# Patient Record
Sex: MALE | Race: WHITE | NOT HISPANIC OR LATINO | Employment: FULL TIME | URBAN - NONMETROPOLITAN AREA
[De-identification: names, ages, dates, MRNs, and addresses within clinical notes are randomized per-mention and may not be internally consistent; named-entity substitution may affect disease eponyms.]

---

## 2018-01-22 ENCOUNTER — OFFICE VISIT CONVERTED (OUTPATIENT)
Dept: FAMILY MEDICINE CLINIC | Age: 23
End: 2018-01-22
Attending: FAMILY MEDICINE

## 2019-03-13 ENCOUNTER — OFFICE VISIT CONVERTED (OUTPATIENT)
Dept: FAMILY MEDICINE CLINIC | Age: 24
End: 2019-03-13
Attending: FAMILY MEDICINE

## 2019-03-21 ENCOUNTER — HOSPITAL ENCOUNTER (OUTPATIENT)
Dept: OTHER | Facility: HOSPITAL | Age: 24
Discharge: HOME | End: 2019-03-21
Attending: FAMILY MEDICINE

## 2019-03-21 LAB
ALBUMIN SERPL-MCNC: 4.5 G/DL (ref 3.5–5)
ALBUMIN/GLOB SERPL: 1.4 {RATIO} (ref 1.4–2.6)
ALP SERPL-CCNC: 122 U/L (ref 53–128)
ALT SERPL-CCNC: 17 U/L (ref 10–40)
ANION GAP SERPL CALC-SCNC: 16 MMOL/L (ref 8–19)
AST SERPL-CCNC: 17 U/L (ref 15–50)
BASOPHILS # BLD MANUAL: 0.01 10*3/UL (ref 0–0.2)
BASOPHILS NFR BLD MANUAL: 0.2 % (ref 0–3)
BILIRUB SERPL-MCNC: 0.52 MG/DL (ref 0.2–1.3)
BUN SERPL-MCNC: 13 MG/DL (ref 5–25)
BUN/CREAT SERPL: 17 {RATIO} (ref 6–20)
CALCIUM SERPL-MCNC: 9.4 MG/DL (ref 8.7–10.4)
CHLORIDE SERPL-SCNC: 102 MMOL/L (ref 99–111)
CHOLEST SERPL-MCNC: 162 MG/DL (ref 107–200)
CHOLEST/HDLC SERPL: 4 {RATIO} (ref 3–6)
CONV CO2: 25 MMOL/L (ref 22–32)
CONV TOTAL PROTEIN: 7.8 G/DL (ref 6.3–8.2)
CREAT UR-MCNC: 0.75 MG/DL (ref 0.7–1.2)
DEPRECATED RDW RBC AUTO: 38.4 FL
EOSINOPHIL # BLD MANUAL: 0.04 10*3/UL (ref 0–0.7)
EOSINOPHIL NFR BLD MANUAL: 0.8 % (ref 0–7)
ERYTHROCYTE [DISTWIDTH] IN BLOOD BY AUTOMATED COUNT: 12 % (ref 11.5–14.5)
GFR SERPLBLD BASED ON 1.73 SQ M-ARVRAT: >60 ML/MIN/{1.73_M2}
GLOBULIN UR ELPH-MCNC: 3.3 G/DL (ref 2–3.5)
GLUCOSE SERPL-MCNC: 91 MG/DL (ref 70–99)
GRANS (ABSOLUTE): 2.96 10*3/UL (ref 2–8)
GRANS: 57.9 % (ref 30–85)
HBA1C MFR BLD: 15.3 G/DL (ref 14–18)
HCT VFR BLD AUTO: 44.5 % (ref 42–52)
HDLC SERPL-MCNC: 41 MG/DL (ref 40–60)
IMM GRANULOCYTES # BLD: 0.01 10*3/UL (ref 0–0.54)
IMM GRANULOCYTES NFR BLD: 0.2 % (ref 0–0.43)
LDLC SERPL CALC-MCNC: 105 MG/DL (ref 70–100)
LYMPHOCYTES # BLD MANUAL: 1.81 10*3/UL (ref 1–5)
LYMPHOCYTES NFR BLD MANUAL: 5.5 % (ref 3–10)
MCH RBC QN AUTO: 29.7 PG (ref 27–31)
MCHC RBC AUTO-ENTMCNC: 34.4 G/DL (ref 33–37)
MCV RBC AUTO: 86.2 FL (ref 80–96)
MONOCYTES # BLD AUTO: 0.28 10*3/UL (ref 0.2–1.2)
OSMOLALITY SERPL CALC.SUM OF ELEC: 288 MOSM/KG (ref 273–304)
PLATELET # BLD AUTO: 339 10*3/UL (ref 130–400)
PMV BLD AUTO: 9.2 FL (ref 7.4–10.4)
POTASSIUM SERPL-SCNC: 4 MMOL/L (ref 3.5–5.3)
RBC # BLD AUTO: 5.16 10*6/UL (ref 4.7–6.1)
SODIUM SERPL-SCNC: 139 MMOL/L (ref 135–147)
TRIGL SERPL-MCNC: 82 MG/DL (ref 40–150)
TSH SERPL-ACNC: 1.21 M[IU]/L (ref 0.27–4.2)
VARIANT LYMPHS NFR BLD MANUAL: 35.4 % (ref 20–45)
VLDLC SERPL-MCNC: 16 MG/DL (ref 5–37)
WBC # BLD AUTO: 5.11 10*3/UL (ref 4.8–10.8)

## 2020-04-24 ENCOUNTER — OFFICE VISIT CONVERTED (OUTPATIENT)
Dept: FAMILY MEDICINE CLINIC | Age: 25
End: 2020-04-24
Attending: FAMILY MEDICINE

## 2020-05-07 ENCOUNTER — OFFICE VISIT CONVERTED (OUTPATIENT)
Dept: FAMILY MEDICINE CLINIC | Age: 25
End: 2020-05-07
Attending: FAMILY MEDICINE

## 2020-05-19 ENCOUNTER — HOSPITAL ENCOUNTER (OUTPATIENT)
Dept: OTHER | Facility: HOSPITAL | Age: 25
Discharge: HOME | End: 2020-05-19
Attending: FAMILY MEDICINE

## 2020-05-20 LAB
ALBUMIN SERPL-MCNC: 4.6 G/DL (ref 3.5–5)
ALBUMIN/GLOB SERPL: 1.5 {RATIO} (ref 1.4–2.6)
ALP SERPL-CCNC: 123 U/L (ref 53–128)
ALT SERPL-CCNC: 19 U/L (ref 10–40)
ANION GAP SERPL CALC-SCNC: 17 MMOL/L (ref 8–19)
AST SERPL-CCNC: 18 U/L (ref 15–50)
BASOPHILS # BLD MANUAL: 0.02 10*3/UL (ref 0–0.2)
BASOPHILS NFR BLD MANUAL: 0.2 % (ref 0–3)
BILIRUB SERPL-MCNC: 0.57 MG/DL (ref 0.2–1.3)
BUN SERPL-MCNC: 12 MG/DL (ref 5–25)
BUN/CREAT SERPL: 12 {RATIO} (ref 6–20)
CALCIUM SERPL-MCNC: 9.8 MG/DL (ref 8.7–10.4)
CHLORIDE SERPL-SCNC: 101 MMOL/L (ref 99–111)
CHOLEST SERPL-MCNC: 173 MG/DL (ref 107–200)
CHOLEST/HDLC SERPL: 4.3 {RATIO} (ref 3–6)
CONV CO2: 25 MMOL/L (ref 22–32)
CONV TOTAL PROTEIN: 7.6 G/DL (ref 6.3–8.2)
CREAT UR-MCNC: 0.98 MG/DL (ref 0.7–1.2)
DEPRECATED RDW RBC AUTO: 38.9 FL
EOSINOPHIL # BLD MANUAL: 0.03 10*3/UL (ref 0–0.7)
EOSINOPHIL NFR BLD MANUAL: 0.4 % (ref 0–7)
ERYTHROCYTE [DISTWIDTH] IN BLOOD BY AUTOMATED COUNT: 12.3 % (ref 11.5–14.5)
GFR SERPLBLD BASED ON 1.73 SQ M-ARVRAT: >60 ML/MIN/{1.73_M2}
GLOBULIN UR ELPH-MCNC: 3 G/DL (ref 2–3.5)
GLUCOSE SERPL-MCNC: 93 MG/DL (ref 70–99)
GRANS (ABSOLUTE): 6.03 10*3/UL (ref 2–8)
GRANS: 74.5 % (ref 30–85)
HBA1C MFR BLD: 15.5 G/DL (ref 14–18)
HCT VFR BLD AUTO: 46.1 % (ref 42–52)
HDLC SERPL-MCNC: 40 MG/DL (ref 40–60)
IMM GRANULOCYTES # BLD: 0.03 10*3/UL (ref 0–0.54)
IMM GRANULOCYTES NFR BLD: 0.4 % (ref 0–0.43)
LDLC SERPL CALC-MCNC: 115 MG/DL (ref 70–100)
LYMPHOCYTES # BLD MANUAL: 1.55 10*3/UL (ref 1–5)
LYMPHOCYTES NFR BLD MANUAL: 5.4 % (ref 3–10)
MCH RBC QN AUTO: 28.9 PG (ref 27–31)
MCHC RBC AUTO-ENTMCNC: 33.6 G/DL (ref 33–37)
MCV RBC AUTO: 86 FL (ref 80–96)
MONOCYTES # BLD AUTO: 0.44 10*3/UL (ref 0.2–1.2)
OSMOLALITY SERPL CALC.SUM OF ELEC: 287 MOSM/KG (ref 273–304)
PLATELET # BLD AUTO: 332 10*3/UL (ref 130–400)
PMV BLD AUTO: 8.6 FL (ref 7.4–10.4)
POTASSIUM SERPL-SCNC: 4.1 MMOL/L (ref 3.5–5.3)
RBC # BLD AUTO: 5.36 10*6/UL (ref 4.7–6.1)
SODIUM SERPL-SCNC: 139 MMOL/L (ref 135–147)
TRIGL SERPL-MCNC: 89 MG/DL (ref 40–150)
TSH SERPL-ACNC: 1.04 M[IU]/L (ref 0.27–4.2)
VARIANT LYMPHS NFR BLD MANUAL: 19.1 % (ref 20–45)
VLDLC SERPL-MCNC: 18 MG/DL (ref 5–37)
WBC # BLD AUTO: 8.1 10*3/UL (ref 4.8–10.8)

## 2020-05-27 ENCOUNTER — OFFICE VISIT CONVERTED (OUTPATIENT)
Dept: FAMILY MEDICINE CLINIC | Age: 25
End: 2020-05-27
Attending: FAMILY MEDICINE

## 2020-06-02 ENCOUNTER — OFFICE VISIT CONVERTED (OUTPATIENT)
Dept: FAMILY MEDICINE CLINIC | Age: 25
End: 2020-06-02
Attending: FAMILY MEDICINE

## 2020-06-16 ENCOUNTER — HOSPITAL ENCOUNTER (OUTPATIENT)
Dept: PHYSICAL THERAPY | Facility: CLINIC | Age: 25
Setting detail: RECURRING SERIES
Discharge: HOME | End: 2020-07-27
Attending: FAMILY MEDICINE

## 2020-07-15 ENCOUNTER — OFFICE VISIT CONVERTED (OUTPATIENT)
Dept: OTOLARYNGOLOGY | Facility: CLINIC | Age: 25
End: 2020-07-15
Attending: OTOLARYNGOLOGY

## 2020-10-21 ENCOUNTER — OFFICE VISIT CONVERTED (OUTPATIENT)
Dept: FAMILY MEDICINE CLINIC | Age: 25
End: 2020-10-21
Attending: FAMILY MEDICINE

## 2021-05-10 NOTE — H&P
History and Physical      Patient Name: David Aaron   Patient ID: [id number]   Sex: Male   YOB: 1995    Primary Care Provider: Carlos Silva MD   Referring Provider: Carlos Silva MD    Visit Date: July 15, 2020    Provider: Yung Arcos MD   Location: [facility]   Location Address: [address] [place], [place] [address]an [facility]  [address] [place] [address]  [id number]   Location Phone: [phone]          Chief Complaint     1.  Shortness of breath    2.  Allergic rhinitis       History Of Present Illness  David Aaron is a 25 year old male who presents to the office today as a consult from Carlos Silva MD.      He presents the clinic today for evaluation of issues with allergic rhinitis and shortness of breath.  He notes that he has had significant issues with nasal congestion, seems to be worse during allergy season.  He is currently not doing any nasal saline irrigations.  He does have fluticasone, but does not use this regularly.  He was seeing an allergist, and manometry indicated a flat inspiratory loop, possibly suggestive of vocal fold dysfunction.  He notes that he has never had breathy or hoarse voice or loss of voice.  He denies any stridor during the spells.  He was sent here for direct visualization of the vocal folds due to these results.       Past Medical History  Allergic rhinitis; Anxiety         Past Surgical History  Appendectomy         Medication List  epinephrine 0.3 mg/0.3 mL injection auto-injector; fluticasone propionate 50 mcg/actuation nasal spray,suspension; hydroxyzine HCl 25 mg oral tablet; hyoscyamine sulfate 0.375 mg oral tablet extended release 12 hr; Zyrtec 10 mg oral tablet         Allergy List  NO KNOWN DRUG ALLERGIES         Family Medical History  Family history of heart disease; Family history of diabetes mellitus         Social History  Tobacco (Never)         Review of Systems  · Constitutional  o Denies  o : fever, night sweats, weight  "loss  · Eyes  o Denies  o : discharge from eye, impaired vision  · HENT  o Admits  o : *See HPI  · Cardiovascular  o Denies  o : chest pain, irregular heart beats  · Respiratory  o Denies  o : shortness of breath, wheezing, coughing up blood  · Gastrointestinal  o Admits  o : reflux  o Denies  o : heartburn, vomiting blood  · Genitourinary  o Denies  o : frequency of urine  · Integument  o Denies  o : rash, skin dryness  · Neurologic  o Denies  o : seizures, loss of balance, loss of consciousness, dizziness  · Endocrine  o Denies  o : cold intolerance, heat intolerance  · Heme-Lymph  o Denies  o : easy bleeding, anemia      Vitals  Date Time BP Position Site L\R Cuff Size HR RR TEMP (F) WT  HT  BMI kg/m2 BSA m2 O2 Sat HC       07/15/2020 11:41 AM       16 96.7 231lbs 16oz 5'  10\" 33.29 2.28           Physical Examination  · Constitutional  o Appearance  o : well developed, well-nourished, alert and in no acute distress, voice clear and strong  · Head and Face  o Head  o :   § Inspection  § : no deformities or lesions  o Face  o :   § Inspection  § : No facial lesions; House-Brackmann I/VI bilaterally  § Palpation  § : No TMJ crepitus nor  muscle tenderness bilaterally  · Eyes  o Vision  o :   § Visual Fields  § : Extraocular movements are intact. No spontaneous or gaze-induced nystagmus.  o Conjunctivae  o : clear  o Sclerae  o : clear  o Pupils and Irises  o : pupils equal, round, and reactive to light.   · Ears, Nose, Mouth and Throat  o Ears  o :   § External Ears  § : appearance within normal limits, no lesions present  § Otoscopic Examination  § : tympanic membrane appearance within normal limits bilaterally without perforations, well-aerated middle ears  § Hearing  § : intact to conversational voice both ears  o Nose  o :   § External Nose  § : appearance normal  § Intranasal Exam  § : mucosa within normal limits, vestibules normal, no intranasal lesions present, septum midline, sinuses non tender to " percussion  o Oral Cavity  o :   § Oral Mucosa  § : oral mucosa normal without pallor or cyanosis  § Lips  § : lip appearance normal  § Teeth  § : normal dentition for age  § Gums  § : gums pink, non-swollen, no bleeding present  § Tongue  § : tongue appearance normal; normal mobility  § Palate  § : hard palate normal, soft palate appearance normal with symmetric mobility  o Throat  o :   § Oropharynx  § : no inflammation or lesions present, tonsils within normal limits  § Hypopharynx  § : appearance within normal limits, superior epiglottis within normal limits  § Larynx  § : appearance within normal limits, vocal cords within normal limits, no lesions present  · Neck  o Inspection/Palpation  o : normal appearance, no masses or tenderness, trachea midline; thyroid size normal, nontender, no nodules or masses present on palpation  · Respiratory  o Respiratory Effort  o : breathing unlabored  o Inspection of Chest  o : normal appearance, no retractions  · Cardiovascular  o Heart  o : regular rate and rhythm  · Lymphatic  o Neck  o : no lymphadenopathy present  o Supraclavicular Nodes  o : no lymphadenopathy present  o Preauricular Nodes  o : no lymphadenopathy present  · Skin and Subcutaneous Tissue  o General Inspection  o : Regarding face and neck - there are no rashes present, no lesions present, and no areas of discoloration  · Neurologic  o Cranial Nerves  o : cranial nerves II-XII are grossly intact bilaterally  o Gait and Station  o : normal gait, able to stand without diffculty  · Psychiatric  o Judgement and Insight  o : judgment and insight intact  o Mood and Affect  o : mood normal, affect appropriate          Assessment  · Allergic rhinitis     477.9/J30.9  · Shortness of breath     786.05/R06.02    Problems Reconciled  Plan  · Medications  o Medications have been Reconciled  o Transition of Care or Provider Policy  · Instructions  o He presents the clinic today for evaluation of issues with allergic  rhinitis and shortness of breath. He notes that he has had significant issues with nasal congestion, seems to be worse during allergy season. He is currently not doing any nasal saline irrigations. He does have fluticasone, but does not use this regularly. He was seeing an allergist, and manometry indicated a flat inspiratory loop, possibly suggestive of vocal fold dysfunction. He notes that he has never had breathy or hoarse voice or loss of voice. He denies any stridor during the spells. He was sent here for direct visualization of the vocal folds due to these results. On examination, his voice was normal and strong. I informed him about a nasolaryngoscopy, but he wanted to hold off for now as the history does not really fit with paradoxical vocal fold movement. I will have him use nasal saline irrigations and fluticasone on a regular basis, and he will contact me should there be any further issues so that we can plan on the nasolaryngoscopy.  o Electronically Identified Patient Education Materials Provided Electronically            Electronically Signed by: Yung Arcos MD -Author on August 28, 2020 04:47:20 PM

## 2021-05-15 VITALS — WEIGHT: 232 LBS | TEMPERATURE: 96.7 F | BODY MASS INDEX: 33.21 KG/M2 | HEIGHT: 70 IN | RESPIRATION RATE: 16 BRPM

## 2021-05-18 NOTE — PROGRESS NOTES
David Aaron. 1995     Office/Outpatient Visit    Visit Date: Wed, Mar 13, 2019 10:45 am    Provider: Jan Cheema MD (Assistant: Rubi Barclay MA)    Location: [facility]        Electronically signed by Jan Cheema MD on  03/17/2019 05:49:51 PM                             SUBJECTIVE:        CC:     Mr. Aaron is a 23 year old White male.  Patient complains of rash and is seeking cream reccomendations.          HPI:     Rash in groin for the last 3 months. Similar to rash from a year ago. He's taken a bunch of different OTC creams, lotrimin, lamisil, vasoline. Some using twice a day. Cream last time worked well but he ran out of it. Did have athletes foot but not any more.     Slightly elevated blood pressure today and at previous visit. HR also on higher end fo normal.     ROS:     CONSTITUTIONAL:  Negative for chills and fever.      CARDIOVASCULAR:  Negative for chest pain, palpitations, tachycardia, orthopnea, and edema.      RESPIRATORY:  Negative for recent cough and dyspnea.      GASTROINTESTINAL:  Negative for abdominal pain, nausea and vomiting.      INTEGUMENTARY:  Positive for rash.      PSYCHIATRIC:  Negative for anxiety, depression, and sleep disturbances.          PMH/FMH/SH:     Last Reviewed on 3/13/2019 10:59 AM by Jan Cheema    Past Medical History:     UNREMARKABLE         Surgical History:     NONE         Family History:         Positive for Type 2 Diabetes ( pat. GM ).          Social History:         Household:  Lives with his parents and sibling(s) ( 1 brother ).      No exposure to tobacco smoke.          Tobacco/Alcohol/Supplements:     Last Reviewed on 3/13/2019 10:59 AM by Jan Cheema    Tobacco: He has never smoked.          Substance Abuse History:     Last Reviewed on 3/13/2019 10:59 AM by Jan Cheema        Mental Health History:     Last Reviewed on 3/13/2019 10:59 AM by Jan Cheema        Communicable Diseases (eg STDs):     Last  Reviewed on 3/13/2019 10:59 AM by Jan Cheema            Current Problems:     Last Reviewed on 3/13/2019 10:59 AM by Jan Cheema    IBS, diarrhea prominent type     Allergies     Tinea corporis         Immunizations:     None        Allergies:     Last Reviewed on 3/13/2019 10:59 AM by Jan Cheema      No Known Drug Allergies.         Current Medications:     Last Reviewed on 3/13/2019 10:59 AM by Jan Cheema    allergy shots weekly     EpiPen Auto-Injector 2-Siddharth 0.3mg Solution For Injection use as directed  PRN     Fluticasone Propionate 50mcg/1actuation Nasal Spray 1 spray each nostil daily     Hyoscyamine Sulfate 0.375mg Tablets, Extended Release Take 1 tablet(s) by mouth bid     Zyrtec 10mg Tablet 1 tab daily         OBJECTIVE:        Vitals:         Current: 3/13/2019 10:49:35 AM    Ht:  5 ft, 8.5 in;  Wt: 240.4 lbs;  BMI: 36.0    T: 98.5 F (oral);  BP: 139/72 mm Hg (left arm, sitting);  P: 90 bpm (left arm (BP Cuff), sitting);  sCr: 0.89 mg/dL;  GFR: 140.27        Exams:     PHYSICAL EXAM:     GENERAL: Vitals recorded well developed, well nourished;  well groomed;  no apparent distress;     EYES: extraocular movements intact; conjunctiva and cornea are normal; PERRLA;     E/N/T: OROPHARYNX:  normal mucosa, dentition, gingiva, and posterior pharynx;     RESPIRATORY: normal respiratory rate and pattern with no distress; normal breath sounds with no rales, rhonchi, wheezes or rubs;     CARDIOVASCULAR: normal rate; rhythm is regular;  no systolic murmur; no edema;     GASTROINTESTINAL: nontender; normal bowel sounds;     SKIN: tinea cruris;     MUSCULOSKELETAL: normal gait; normal overall tone     NEUROLOGIC: mental status: alert and oriented x 3;     PSYCHIATRIC:  appropriate affect and demeanor; normal speech pattern; grossly normal memory;         ASSESSMENT           110.5   B35.4  Tinea corporis              DDx:     796.2   R03.0  Elevated blood pressure without a diagnosis of  hypertension              DDx:         ORDERS:         Meds Prescribed:       Terbinafine HCl 250mg Tablet 1 tab po daily x 12 wks  #84 ([facility]) tablet(s) Refills: 0         Lab Orders:       [id number]  [facility] PHYSICAL: CMP, CBC, TSH, LIPID: [id number], [id number], [id number], [id number]  (Send-Out)         APPTO  Appointment need  (In-House)                   PLAN:          Tinea corporis DDx jock itch vs yeast. terbanifine prescribed and lotrisone cream as well. If no improvement with these medicines. Pt will call back in 1 week and I'll prescribe nystatin powder and diflucan.           Prescriptions:       Terbinafine HCl 250mg Tablet 1 tab po daily x 12 wks  #84 ([facility]) tablet(s) Refills: 0          Elevated blood pressure without a diagnosis of hypertension Elevated blood pressure today, will get basic labs and if glucose is elevated may add on A1c.     LABORATORY:  Labs ordered to be performed today include PHYSICAL PANEL; CMP, CBC, TSH, LIPID.      FOLLOW-UP: Schedule a follow-up visit in 2 months..            Orders:       [id number]  [facility] PHYSICAL: CMP, CBC, TSH, LIPID: [id number], [id number], [id number], [id number]  (Send-Out)         APPTO  Appointment need  (In-House)               Patient Recommendations:        For  Elevated blood pressure without a diagnosis of hypertension:     Schedule a follow-up visit in 2 months.                APPOINTMENT INFORMATION:        Monday Tuesday Wednesday Thursday Friday Saturday Sunday            Time:___________________AM  PM   Date:_____________________             CHARGE CAPTURE           **Please note: ICD descriptions below are intended for billing purposes only and may not represent clinical diagnoses**        Primary Diagnosis:         110.5 Tinea corporis            B35.4    Tinea corporis              Orders:          [id number]   Office/outpatient visit; established patient, level 4  (In-House)           796.2 Elevated blood pressure without a diagnosis of hypertension             R03.0    Elevated blood-pressure reading, without diagnosis of hypertension              Orders:          APPTO   Appointment need  (In-House)

## 2021-05-18 NOTE — PROGRESS NOTES
"David Aaron  1995     Office/Outpatient Visit    Visit Date: Thu, May 7, 2020 12:50 pm    Provider: Carlos Silva MD (Assistant: Agnieszka Brown MA)    Location: [facility]        Electronically signed by Carlos Silva MD on  05/28/2020 08:56:03 AM                             Subjective:        CC: Mr. Aaron is a 24 year old White male.  establishment with pcp.  Today's encounter is being done with a telehealth visit. He has consented verbally with two witnesses for todays treatment. Todays visit is being conducted by audio only. Individuals present during the telemedicine consultation include Patient and Dr. Silva         HPI: David is being evaluated today for intermittent shortness of breath.  He says he feels as though he \"Cannot get a full breath and.\"  This happens randomly throughout the day and resolve spontaneously.  Of note, he has a history of seasonal/environmental allergies for which he previously saw an allergist but has not been to their office \"in a couple years.\"  He has scheduled an appointment to reestablish with him next week.  He denies fever, chills, cough, chest pain, edema or palpitations.  He has no prior history of lung or heart disease.  Family history is positive for both CAD and COPD.  He is a non-smoker. His current allergy regimen includes Zyrtec 10 mg daily and Flonase daily.  He previously been on allergy shots and is hoping that these will be restarted. He recently went to the emergency department for his complaints of shortness of breath and had an extensive work-up completed including CT PE, chest x-ray, CBC, CMP, troponin, EKG and pro calcitonin.  This work-up was unremarkable except for a mildly elevated white count of 12.5.    ROS:     CONSTITUTIONAL:  Negative for chills, fatigue and fever.      EYES:  Negative for blurred vision.      E/N/T:  Positive for nasal congestion and frequent rhinorrhea.   Negative for ear pain, tinnitus, hoarseness, " sinus pressure or sore throat.      CARDIOVASCULAR:  Negative for chest pain, dizziness, palpitations and edema.      RESPIRATORY:  Positive for dyspnea ( intermittent ).   Negative for cough.      GASTROINTESTINAL:  Negative for abdominal pain, constipation, diarrhea, heartburn, nausea and vomiting.      GENITOURINARY:  Negative for dysuria, hematuria and polyuria.      MUSCULOSKELETAL:  Negative for arthralgias and myalgias.      INTEGUMENTARY:  Negative for rash.      NEUROLOGICAL:  Negative for headaches, paresthesias and weakness.      HEMATOLOGIC/LYMPHATIC:  Negative for easy bruising and excessive bleeding.      ENDOCRINE:  Negative for hair loss, heat/cold intolerance, polydipsia, and polyphagia.      ALLERGIC/IMMUNOLOGIC:  Positive for seasonal allergies.      PSYCHIATRIC:  Negative for anxiety, depression, and sleep disturbances.          Past Medical History / Family History / Social History:         Last Reviewed on 5/28/2020 08:55 AM by Carlos Silva    Past Medical History:     UNREMARKABLE         Surgical History:     NONE         Family History:         Positive for Type 2 Diabetes ( pat. GM ).      Positive for Coronary Artery Disease, Hypertension and Myocardial Infarction;     Positive for COPD;     Positive for Cerebrovascular Accident;         Social History:         Household:  Lives with his parents and sibling(s) ( 1 brother ).      No exposure to tobacco smoke.          Tobacco/Alcohol/Supplements:     Last Reviewed on 5/28/2020 08:55 AM by Carlos Silva    Tobacco: He has never smoked.          Substance Abuse History:     Last Reviewed on 5/28/2020 08:55 AM by Carlos Silva        Mental Health History:     Last Reviewed on 5/28/2020 08:55 AM by Carlos Silva    NEGATIVE         Communicable Diseases (eg STDs):     Last Reviewed on 5/28/2020 08:55 AM by Carlos Silva        Current Problems:     Last Reviewed on 5/28/2020 08:55 AM by Carlos Silva    Shortness of breath    Encounter for  general adult medical examination without abnormal findings    Allergic rhinitis, unspecified    Anxiety disorder, unspecified    Encounter for screening for depression        Immunizations:     None        Allergies:     Last Reviewed on 5/28/2020 08:55 AM by Carlos Silva    No Known Allergies.        Current Medications:     Last Reviewed on 5/28/2020 08:55 AM by Carlos Silva    Zyrtec 10 mg oral tablet [1 tab daily]    Fluticasone Propionate 50 mcg/actuation Intranasal Spray, Suspension [1 spray each nostil daily]    Hyoscyamine Sulfate 0.375 mg oral Tablet, Extended Release 12 hr [Take 1 tablet(s) by mouth bid]    EpiPen Auto-Injector 2-Siddharth 0.3mg 1:1,000 Solution For Injection [use as directed  PRN ]    allergy shots weekly         Assessment:         J30.9   Allergic rhinitis, unspecified       R06.02   Shortness of breath           ORDERS:         Meds Prescribed:       [Refilled] EPINEPHrine 0.3 mg/0.3 mL injection Auto-Injector [inject 0.3 milliliter (0.3 mg) by intramuscular route once], #2 (two) each, Refills: 3 (three)                 Plan:         Allergic rhinitis, unspecified- Not currently controlled.  Patient plans to reestablish with allergist within the next week.  Continue Zyrtec 10 mg daily and Flonase daily.    Telehealth: Verbal consent obtained for visit to occur via phone call; Total time spent was 15 minutes; [id number]--Itodhwpvp E/M 11-20 minutes           Prescriptions:       [Refilled] EPINEPHrine 0.3 mg/0.3 mL injection Auto-Injector [inject 0.3 milliliter (0.3 mg) by intramuscular route once], #2 (two) each, Refills: 3 (three)         Shortness of breath- Unclear etiology. CT PE, chest x-ray, CBC, troponin and procalcitonin were all normal at recent emergency department visit.  Asthma is a possible etiology especially in the setting of uncontrolled allergies.  Anxiety also has to remain on the differential. Will re-establish with allergist/asthma specialist as above.             Charge  Capture:         Primary Diagnosis:     J30.9  Allergic rhinitis, unspecified           Orders:      [id number]  Phys/QHP telephone evaluation 11-20 minutes  (In-House)              R06.02  Shortness of breath

## 2021-05-18 NOTE — PROGRESS NOTES
David Aaron  1995     Office/Outpatient Visit    Visit Date: Fri, Apr 24, 2020 03:18 pm    Provider: Jan Cheema MD (Assistant: Sivan Loomis MA)    Location: [facility]        Electronically signed by Jan Cheema MD on  04/27/2020 06:57:18 PM                             Subjective:        CC: CONSENT BY ADAM    [phone]- AUDIOMrViji Aaron is a 24 year old White male.  He is here today following a transition of care from the emergency department ( [facility] 4/22/20, FOR SOA ).  He presents with SOA, nonproductive cough.          HPI:       On Tuesday 4/21 pt developed shortness of breath, felt like he couldn't breath and he went to [facility]. He couldn't get a full breath. Maybe slight chest pain. CTA Chest and CXR were normal. CBC normal aside from WBC 12.5. CMP, troponin, BNP, procalcitonin, and EKG were all reassuring. Since then he has been laying around, maybe a little short of breath, decreased appetite. He used to work at [employer] but he's been off for the last month.    ROS:     CONSTITUTIONAL:  Positive for fatigue.   Negative for fever.      EYES:  Negative for blurred vision.      E/N/T:  Negative for diminished hearing and nasal congestion.      CARDIOVASCULAR:  Negative for chest pain and palpitations.      RESPIRATORY:  Positive for recent cough ( typically dry ) and dyspnea ( at rest ).      GASTROINTESTINAL:  Positive for anorexia ( loss of appetite ).   Negative for abdominal pain, constipation, diarrhea, nausea or vomiting.      MUSCULOSKELETAL:  Negative for arthralgias and myalgias.      NEUROLOGICAL:  Negative for paresthesias and weakness.      PSYCHIATRIC:  Negative for anxiety, depression and sleep disturbance.          Past Medical History / Family History / Social History:         Last Reviewed on 4/27/2020 06:49 PM by Jan Cheema    Past Medical History:     UNREMARKABLE         Surgical History:     NONE         Family History:         Positive for  Type 2 Diabetes ( pat. GM ).          Social History:         Household:  Lives with his parents and sibling(s) ( 1 brother ).      No exposure to tobacco smoke.          Tobacco/Alcohol/Supplements:     Last Reviewed on 4/27/2020 06:49 PM by Jan Cheema    Tobacco: He has never smoked.          Substance Abuse History:     Last Reviewed on 4/27/2020 06:49 PM by Jan Cheema        Mental Health History:     Last Reviewed on 4/27/2020 06:49 PM by Jan Cheema    NEGATIVE         Communicable Diseases (eg STDs):     Last Reviewed on 4/27/2020 06:49 PM by Jan Cheema        Current Problems:     Last Reviewed on 4/27/2020 06:49 PM by Jan Cheema    Allergies    IBS, diarrhea prominent type    Shortness of breath        Immunizations:     None        Allergies:     Last Reviewed on 4/27/2020 06:49 PM by Jan Cheema    No Known Allergies.        Current Medications:     Last Reviewed on 4/27/2020 06:49 PM by Jan Cheema    Zyrtec 10mg Tablet [1 tab daily]    Fluticasone Propionate 50mcg/1actuation Nasal Spray [1 spray each nostil daily]    Hyoscyamine Sulfate 0.375mg Tablets, Extended Release [Take 1 tablet(s) by mouth bid]    EpiPen Auto-Injector 2-Siddharth 0.3mg 1:1,000 Solution For Injection [use as directed  PRN ]    allergy shots weekly        Objective:        Vitals:         Current: 4/24/2020 3:22:37 PM    Ht:  5 ft, 8.5 inT: 97.8 F (oral);  sCr: 0.75 mg/dL;  GFR: 128.53        Assessment:         R06.02   Shortness of breath           Plan:         Shortness of breathw/u in ER was normal and reassuring aside from WBC of 12.5. CTA Chest and CXR were normal. CMP, troponin, BNP, procalcitonin, and EKG were all reassuring. Since then Sx have been mild. No additional w/u seems indicated at this time. This may be an anxiety attack vs GERD or viral URI.     Telehealth: Verbal consent obtained for visit to occur via phone call; Staff, other than provider, present during telephone visit  include Sivan Loomis; Total time spent was 14 minutes; [id number]--[id number] E/M 11-20 minutes             Charge Capture:         Primary Diagnosis:     R06.02  Shortness of breath           Orders:      [id number]  Phys/QHP telephone evaluation 11-20 minutes  (In-House)

## 2021-05-18 NOTE — PROGRESS NOTES
David Aaron  1995     Office/Outpatient Visit    Visit Date: Wed, Oct 21, 2020 08:37 am    Provider: Carols Silva MD (Assistant: Mandie Rushing MA)    Location: [facility]        Electronically signed by Carlos Silva MD on  10/21/2020 12:29:38 PM                             Subjective:        CC: Mr. Aaron is a 25 year old White male.  sharp chest pain pt states he is not taking cyclobenzaprine         HPI:       David presents to clinic for evaluation of persistent left shoulder/chest pain.  His pain has been present for last 4 to 5 months.  It occurs randomly without known exacerbating or relieving factors.  No known inciting event or injury.  He describes the pain as sharp in character.  It tends to occur first thing in the morning when he exhales or tries to take a deep breath.  It gradually resolves as the morning goes on and it reoccurs in the evening.  Each individual episode lasts for only several seconds and resolves on its own.  No shortness of breath.  No diaphoresis.  No cough or congestion.  No fever chills previously, he was tried on Flexeril.  This was unhelpful.  He has been to the emergency department previously for complaints of chest pain and cardiac work-up including troponin EKG and chest x-ray was also negative.          PHQ-9 Depression Screening: Completed form scanned and in chart; Total Score 1     ROS:     CONSTITUTIONAL:  Negative for chills, fatigue, fever, and weight change.      CARDIOVASCULAR:  Negative for chest pain, dizziness, palpitations and edema.      RESPIRATORY:  Negative for dyspnea and cough.      GASTROINTESTINAL:  Negative for abdominal pain, diarrhea, nausea and vomiting.      MUSCULOSKELETAL:  Positive for left shoulder/upper chest pain.      NEUROLOGICAL:  Negative for headaches, paresthesias and weakness.          Past Medical History / Family History / Social History:         Last Reviewed on 10/21/2020 12:29 PM by Carlos Silva     Past Medical History:     UNREMARKABLE         Surgical History:     NONE         Family History:         Positive for Type 2 Diabetes ( pat. GM ).      Positive for Coronary Artery Disease, Hypertension and Myocardial Infarction;     Positive for COPD;     Positive for Cerebrovascular Accident;         Social History:         Household:  Lives with his parents and sibling(s) ( 1 brother ).      No exposure to tobacco smoke.          Tobacco/Alcohol/Supplements:     Last Reviewed on 10/21/2020 12:29 PM by Carlos Silva    Tobacco: He has never smoked.          Substance Abuse History:     Last Reviewed on 10/21/2020 12:29 PM by Carlos Silva        Mental Health History:     Last Reviewed on 10/21/2020 12:29 PM by Carlos Silva    NEGATIVE         Communicable Diseases (eg STDs):     Last Reviewed on 10/21/2020 12:29 PM by Carlos Silva        Current Problems:     Last Reviewed on 10/21/2020 12:29 PM by Carlos Silva    Shortness of breath    Encounter for general adult medical examination without abnormal findings    Allergic rhinitis, unspecified    Encounter for screening for depression    Anxiety disorder, unspecified    Pain in left shoulder        Immunizations:     None        Allergies:     Last Reviewed on 10/21/2020 12:29 PM by Carlos Silva    No Known Allergies.        Current Medications:     Last Reviewed on 10/21/2020 12:29 PM by Carlos Silva    Zyrtec 10 mg oral tablet [1 tab daily]    Fluticasone Propionate 50 mcg/actuation Intranasal Spray, Suspension [1 spray each nostil daily]    Hyoscyamine Sulfate 0.375 mg oral Tablet, Extended Release 12 hr [Take 1 tablet(s) by mouth bid]    allergy shots weekly     EPINEPHrine 0.3 mg/0.3 mL injection Auto-Injector [inject 0.3 milliliter (0.3 mg) by intramuscular route once]    hydrOXYzine HCL 25 mg oral tablet [take 1-2  tablets (25-50 mg) by oral route 3 times per day as needed]    omeprazole 40 mg oral capsule,delayed release (enteric coated) [take 1  capsule (40 mg) by oral route daily]    cyclobenzaprine 10 mg oral tablet [take 1 tablet (10 mg) by oral route 3 times per day as needed]        Objective:        Vitals:         Current: 10/21/2020 8:42:00 AM    Ht:  5 ft, 8.5 in;  Wt: 235.6 lbs;  BMI: 35.3T: 97.7 F (temporal);  BP: 136/80 mm Hg (left arm, sitting);  P: 82 bpm (left arm (BP Cuff), sitting);  sCr: 0.98 mg/dL;  GFR: 124.22        Exams:     PHYSICAL EXAM:     GENERAL: Vitals recorded well developed, well nourished;  no apparent distress;     EYES: conjunctiva and cornea are normal;     RESPIRATORY: normal respiratory rate and pattern with no distress;     CARDIOVASCULAR: regular rate and rhythm; normal S1, S2; no murmur, rub, or gallop; normal PMI;     GASTROINTESTINAL: nontender, nondistended; no hepatosplenomegaly or masses; no bruits;     SKIN:  No significant rashes, lesions or suspicious moles within limits of examination;     MUSCULOSKELETAL: No tenderness to palpation of left shoulder joint or chest wall including upper ribs; Full ROM of motion and 5/5 strength of left shoulder in all planes of motion;     NEUROLOGIC: Grossly intact; mental status: alert and oriented x 3;     PSYCHIATRIC: appropriate affect and demeanor; normal speech pattern; Normal behavior;         Assessment:         M25.512   Pain in left shoulder       Z13.31   Encounter for screening for depression           ORDERS:         Meds Prescribed:       [New Rx] predniSONE 50 mg oral tablet [take 1 tab oral route once daily], #5 (five) tablets, Refills: 0 (zero)         Other Orders:         Depression screen negative  (In-House)                      Plan:         Pain in left shoulder- Unclear etiology.  Differential diagnosis includes but is not limited to costochondritis versus intercostal strain versus shoulder strain versus precordial catch syndrome versus cardiac etiology.  Appears to be more musculoskeletal in nature than cardiac.  At this time, we will try a 5-day  course of steroids.  If no improvement, will consider further work-up including left shoulder x-ray for musculoskeletal etiology and a possible echocardiogram for potential cardiac etiology.  Ultimately, if symptoms continue, he may need a chest CT. ED precautions given.           Prescriptions:       [New Rx] predniSONE 50 mg oral tablet [take 1 tab oral route once daily], #5 (five) tablets, Refills: 0 (zero)         Encounter for screening for depression    MIPS PHQ-9 Depression Screening: Completed form scanned and in chart; Total Score 1; Negative Depression Screen           Orders:         Depression screen negative  (In-House)                  Charge Capture:         Primary Diagnosis:     M25.512  Pain in left shoulder           Orders:      85189  Office/outpatient visit; established patient, level 3  (In-House)              Z13.31  Encounter for screening for depression           Orders:        Depression screen negative  (In-House)                  ADDENDUMS:      ____________________________________    Addendum: 10/23/2020 02:01 PM - Carlos Silva        Orders:    Remove  [id number]    Add  [id number]    -mja

## 2021-05-18 NOTE — PROGRESS NOTES
"David Aaron  1995     Office/Outpatient Visit    Visit Date: Wed, May 27, 2020 10:24 am    Provider: Carlos Silva MD (Assistant: Clinton Solorzano, )    Location: [facility]        Electronically signed by Carlos Silva MD on  05/28/2020 09:43:37 AM                             Subjective:        CC: Mr. Aaron is a 24 year old White male.  physical exam         HPI:           Patient to be evaluated for encounter for general adult medical examination without abnormal findings.  He cannot recall when he last had a physical exam.  He's had vision screening done in unknown.  He performs testicular self-exams occasionally.  He is not current with his Td and influenza immunization.      Smoking Status:  Nonsmoker           PHQ-9 Depression Screening: Completed form scanned and in chart; Total Score 11       As noted at last visit, patient has continued to have intermittent shortness of breath.  He originally went to the emergency department on 4/21 for the same complaints and had an extensive work-up completed including CT PE, chest x-ray, CBC, CMP, troponin, EKG and procalcitonin that was unremarkable except for a mildly elevated white count of 12.5.  Since last visit, he has went to the emergency department again for evaluation for an episode of chest pain coupled with his intermittent shortness of breath.  Work-up was again unremarkable.  He has a history of moderate to severe allergies for which he has recently reestablished with an allergist/asthma specialist.  He notes that this provider did pulmonary function testing that was also unremarkable.  He says that this provider told him that he may have vocal cord dysfunction which is causing his sensation of shortness of breath and possibly laryngopharyngeal reflux. He denies sensation of heartburn.  He has continued to have \"twinges\" of chest pain and is intermittent sensation that he \"Cannot get a full breath and\" has persisted. He notes " "that he is recently considering that there is an anxiety component to his and reports that he \"worries constantly\" about \"what is wrong with him.\" He says that when he finds himself worrying a lot, he has trouble sleeping.    ROS:     CONSTITUTIONAL:  Negative for chills, fatigue and fever.      E/N/T:  Positive for nasal congestion and frequent rhinorrhea.   Negative for ear pain, tinnitus, hoarseness, sinus pressure or sore throat.      CARDIOVASCULAR:  Positive for chest pain ( unrelated to exertion ).   Negative for dizziness, palpitations or edema.      RESPIRATORY:  Positive for dyspnea ( intermittent ).   Negative for cough.      GASTROINTESTINAL:  Negative for abdominal pain, acid reflux symptoms, diarrhea, heartburn, nausea and vomiting.      NEUROLOGICAL:  Negative for headaches, paresthesias and weakness.      ALLERGIC/IMMUNOLOGIC:  Positive for seasonal allergies.      PSYCHIATRIC:  Positive for anxiety and sleep disturbance.   Negative for depression or suicidal thoughts.          Past Medical History / Family History / Social History:         Last Reviewed on 5/28/2020 09:43 AM by Carlos Silva    Past Medical History:     UNREMARKABLE         Surgical History:     NONE         Family History:         Positive for Type 2 Diabetes ( pat. GM ).      Positive for Coronary Artery Disease, Hypertension and Myocardial Infarction;     Positive for COPD;     Positive for Cerebrovascular Accident;         Social History:         Household:  Lives with his parents and sibling(s) ( 1 brother ).      No exposure to tobacco smoke.          Tobacco/Alcohol/Supplements:     Last Reviewed on 5/28/2020 09:43 AM by Carlos Silva    Tobacco: He has never smoked.          Substance Abuse History:     Last Reviewed on 5/28/2020 09:43 AM by Carlos Silva        Mental Health History:     Last Reviewed on 5/28/2020 09:43 AM by Carlos Silva    NEGATIVE         Communicable Diseases (eg STDs):     Last Reviewed on 5/28/2020 " "09:43 AM by Carlos Silva        Current Problems:     Last Reviewed on 5/28/2020 09:43 AM by Carlos Silva    Shortness of breath    Encounter for general adult medical examination without abnormal findings    Allergic rhinitis, unspecified    Anxiety disorder, unspecified    Encounter for screening for depression        Immunizations:     None        Allergies:     Last Reviewed on 5/28/2020 09:43 AM by Carlos Silva    No Known Allergies.        Current Medications:     Last Reviewed on 5/28/2020 09:43 AM by Carlos Silva    Zyrtec 10 mg oral tablet [1 tab daily]    Fluticasone Propionate 50 mcg/actuation Intranasal Spray, Suspension [1 spray each nostil daily]    Hyoscyamine Sulfate 0.375 mg oral Tablet, Extended Release 12 hr [Take 1 tablet(s) by mouth bid]    allergy shots weekly     EPINEPHrine 0.3 mg/0.3 mL injection Auto-Injector [inject 0.3 milliliter (0.3 mg) by intramuscular route once]    omeprazole 40 mg oral capsule,delayed release (enteric coated) [take 1 capsule (40 mg) by oral route daily]        Objective:        Vitals:         Current: 5/27/2020 10:25:25 AM    Ht:  5 ft, 8.5 in;  Wt: 234.6 lbs;  BMI: 35.2T: 97.5 F (oral);  BP: 124/69 mm Hg (left arm, sitting);  P: 102 bpm (left arm (BP Cuff), sitting);  sCr: 0.98 mg/dL;  GFR: 125.03        Exams:     PHYSICAL EXAM:     GENERAL: Vitals recorded well developed, well nourished;  no apparent distress;     EYES: conjunctiva and cornea are normal;     E/N/T:  normal EACs, TMs, nasal/oral mucosa, teeth, gingiva, and oropharynx;     NECK: trachea is midline; thyroid is non-palpable;     RESPIRATORY: Clear to auscultation bilateally; no rales (\"crackles\") present; no rhonchi; no wheezes;     CARDIOVASCULAR: normal rate; rhythm is regular;  No murmurs, clicks, gallops or rubs appreciated; no edema;     GASTROINTESTINAL: nontender; Soft and nondistended; normal bowel sounds; no organomegaly; no masses;     LYMPHATIC: no enlargement of cervical or facial " nodes; no supraclavicular nodes;     SKIN:  No significant rashes, lesions or suspicious moles within limits of examination;     MUSCULOSKELETAL: muscle strength: 5/5 in all major muscle groups;  normal overall tone     NEUROLOGIC: Grossly intact; mental status: alert and oriented x 3;     PSYCHIATRIC: appropriate affect and demeanor; normal speech pattern; Normal behavior;         Assessment:         Z00.00   Encounter for general adult medical examination without abnormal findings       Z13.31   Encounter for screening for depression       R06.02   Shortness of breath       J30.9   Allergic rhinitis, unspecified       F41.9   Anxiety disorder, unspecified           ORDERS:         Meds Prescribed:       [New Rx] hydrOXYzine HCL 25 mg oral tablet [take 1-2  tablets (25-50 mg) by oral route 3 times per day as needed], #30 (thirty) tablets, Refills: 0 (zero)         Procedures Ordered:       REFER  Referral to Specialist or Other Facility  (Send-Out)              Other Orders:         Depression screen negative  (In-House)                      Plan:         Encounter for general adult medical examination without abnormal findings- Appropriate screenings and vaccinations were reviewed with the patient and offered as indicated.  Patient counseled on healthy lifestyle including mass diet adequate physical activity.  Recent screening labs including CBC, CMP, TSH and lipid panel were unremarkable except for a mildly elevated LDL.        Encounter for screening for depression    MIPS PHQ-9 Depression Screening: Completed form scanned and in chart; Total Score 11; Positive Depression Screen but after further evaluation the patient does not have a diagnosis of depression.            Orders:         Depression screen negative  (In-House)              Shortness of breath- Etiology remains undefined. Cardiac and pulmonary etiologies are less likely given negative work-up.  The patient's allergist's opinion that this  could be related to vocal cord dysfunction and/or laryngopharyngeal reflux could be true.  He will continue to follow with his allergist as directed.  We will empirically start omeprazole 40 mg daily for any possible LPR.  We will also refer to ENT for possible laryngoscopy. Of course, anxiety could be playing a role in the patient's symptoms (see below).        Allergic rhinitis, unspecified- See above        REFERRALS:  Referral initiated to an E/N/T.            Orders:       REFER  Referral to Specialist or Other Facility  (Send-Out)              Anxiety disorder, unspecified- Discussed therapeutic options in the treatment of anxiety including lifestyle interventions, counseling/therapy and medications.  Patient declines referral to therapy at this time.  He is not interested in a long-term controlling medication.  However, he is willing to try and on demand medication for acute episodes of anxiety.  Will start hydroxyzine 25 to 50 mg 3 times daily as needed.          Prescriptions:       [New Rx] hydrOXYzine HCL 25 mg oral tablet [take 1-2  tablets (25-50 mg) by oral route 3 times per day as needed], #30 (thirty) tablets, Refills: 0 (zero)             Charge Capture:         Primary Diagnosis:     Z00.00  Encounter for general adult medical examination without abnormal findings           Orders:      [id number]  Preventive medicine, established patient, age 18-39 years  (In-House)              Z13.31  Encounter for screening for depression           Orders:      87772-41  Office/outpatient visit; established patient, level 3  (In-House)              Depression screen negative  (In-House)              R06.02  Shortness of breath     J30.9  Allergic rhinitis, unspecified     F41.9  Anxiety disorder, unspecified

## 2021-05-18 NOTE — PROGRESS NOTES
David Aaron  1995     Office/Outpatient Visit    Visit Date: Tue, Jun 2, 2020 10:13 am    Provider: Carlos Silva MD (Assistant: Spurling, Sarah C, MA)    Location: [facility]        Electronically signed by Carlos Silva MD on  08/19/2020 07:03:29 PM                             Subjective:        CC: Mr. Aaron is a 24 year old White male.  right shoulder pain. telehealth video [phone] Today's encounter is being done with a telehealth visit. He has consented verbally with two witnesses for todays treatment. Todays visit is being conducted by audio and video. Individuals present during the telemedicine consultation include patient and Dr. Silva         HPI:       David is being evaluated today for complaints of left shoulder pain.  This is been present for approximately 1 week.  There was no known inciting event or injury.  No prior history of injury or trauma to the area.  No weakness or paresthesias.  No bruising or swelling.  Pain is worse with overhead movements and with lifting heavy objects.    ROS:     CONSTITUTIONAL:  Negative for chills, fatigue, fever, and weight change.      CARDIOVASCULAR:  Negative for chest pain, dizziness, palpitations and edema.      RESPIRATORY:  Negative for dyspnea and cough.      GASTROINTESTINAL:  Negative for abdominal pain, diarrhea, nausea and vomiting.      MUSCULOSKELETAL:  Positive for left shoulder pain.      NEUROLOGICAL:  Negative for headaches, paresthesias and weakness.          Past Medical History / Family History / Social History:         Last Reviewed on 8/19/2020 07:03 PM by Calros Silva    Past Medical History:     UNREMARKABLE         Surgical History:     NONE         Family History:         Positive for Type 2 Diabetes ( pat. GM ).      Positive for Coronary Artery Disease, Hypertension and Myocardial Infarction;     Positive for COPD;     Positive for Cerebrovascular Accident;         Social History:         Household:  Lives  with his parents and sibling(s) ( 1 brother ).      No exposure to tobacco smoke.          Tobacco/Alcohol/Supplements:     Last Reviewed on 8/19/2020 07:03 PM by Carlos Silva    Tobacco: He has never smoked.          Substance Abuse History:     Last Reviewed on 8/19/2020 07:03 PM by Carlos Silva        Mental Health History:     Last Reviewed on 8/19/2020 07:03 PM by Carlos Silva    NEGATIVE         Communicable Diseases (eg STDs):     Last Reviewed on 8/19/2020 07:03 PM by Carlos Silva        Current Problems:     Last Reviewed on 8/19/2020 07:03 PM by Carlos Silva    Shortness of breath    Encounter for general adult medical examination without abnormal findings    Allergic rhinitis, unspecified    Anxiety disorder, unspecified    Pain in left shoulder    Encounter for screening for depression        Immunizations:     None        Allergies:     Last Reviewed on 8/19/2020 07:03 PM by Carlos Silva    No Known Allergies.        Current Medications:     Last Reviewed on 8/19/2020 07:03 PM by Carlos Silva    Zyrtec 10 mg oral tablet [1 tab daily]    Fluticasone Propionate 50 mcg/actuation Intranasal Spray, Suspension [1 spray each nostil daily]    Hyoscyamine Sulfate 0.375 mg oral Tablet, Extended Release 12 hr [Take 1 tablet(s) by mouth bid]    allergy shots weekly     EPINEPHrine 0.3 mg/0.3 mL injection Auto-Injector [inject 0.3 milliliter (0.3 mg) by intramuscular route once]    hydrOXYzine HCL 25 mg oral tablet [take 1-2  tablets (25-50 mg) by oral route 3 times per day as needed]    omeprazole 40 mg oral capsule,delayed release (enteric coated) [take 1 capsule (40 mg) by oral route daily]        Objective:        Exams:     PHYSICAL EXAM:     GENERAL: Vitals recorded well developed, well nourished;  no apparent distress;     EYES: conjunctiva and cornea are normal;     RESPIRATORY: normal respiratory rate and pattern with no distress;     SKIN:  No significant rashes, lesions or suspicious moles within  limits of examination;     NEUROLOGIC: mental status: alert and oriented x 3; Grossly intact;     PSYCHIATRIC: appropriate affect and demeanor; normal speech pattern; Normal behavior;         Assessment:         M25.512   Pain in left shoulder           ORDERS:         Meds Prescribed:       [New Rx] cyclobenzaprine 10 mg oral tablet [take 1 tablet (10 mg) by oral route 3 times per day as needed], #30 (thirty) tablets, Refills: 0 (zero)         Procedures Ordered:       RFPT  Physical/Occupational Therapy Referral  (Send-Out)                      Plan:         Pain in left shoulder- Differential diagnosis includes sprain versus strain versus arthritis versus rotator cuff tendinopathy.  Will treat conservatively at this time with ibuprofen and Tylenol as needed for pain control.  We will also give Flexeril for associated muscle spasm.  PT referral placed.  If symptoms persist, will consider imaging and/or specialist referral.          Prescriptions:       [New Rx] cyclobenzaprine 10 mg oral tablet [take 1 tablet (10 mg) by oral route 3 times per day as needed], #30 (thirty) tablets, Refills: 0 (zero)           Orders:       RFPT  Physical/Occupational Therapy Referral  (Send-Out)                  Charge Capture:         Primary Diagnosis:     M25.512  Pain in left shoulder           Orders:      [id number]  Office/outpatient visit; established patient, level 3  (In-House)

## 2021-05-18 NOTE — PROGRESS NOTES
David Aaron 1995     Office/Outpatient Visit    Visit Date: Mon, Jan 22, 2018 03:57 pm    Provider: Bryan Nuñez MD (Assistant: Rubi Barclay MA)    Location: [facility]        Electronically signed by Bryan Nuñez MD on  01/22/2018 05:49:41 PM                             SUBJECTIVE:        CC:     Mr. Aaron is a 22 year old White male.  Patient presents with itch and rash on the prostate.          HPI:         Mr. Aaron presents with rash.  This has been a problem for the past several months.  The rash has not occurred previously.  It is located primarily inguinal region.  The rash is pruritic.  Possible contributing factors include SWEATS A LOT AT WORK.  NO CHANGE WITH OTC ANTIFUNGAL CREAMS     ROS:     CONSTITUTIONAL:  Negative for chills and fever.      RESPIRATORY:  Negative for recent cough and dyspnea.      GASTROINTESTINAL:  Negative for abdominal pain, nausea and vomiting.          PMH/FMH/SH:     Last Reviewed on 1/22/2018 04:04 PM by Bryan Nuñez    Past Medical History:     UNREMARKABLE         Surgical History:     NONE         Family History:         Positive for Type 2 Diabetes ( pat. GM ).          Social History:         Household:  Lives with his parents and sibling(s) ( 1 brother ).      No exposure to tobacco smoke.          Tobacco/Alcohol/Supplements:     Last Reviewed on 1/22/2018 04:04 PM by Bryan Nuñez    Tobacco: He has never smoked.          Substance Abuse History:     Last Reviewed on 4/11/2017 01:30 PM by Jessica Arora        Mental Health History:     Last Reviewed on 4/11/2017 01:30 PM by Jessica Arora        Communicable Diseases (eg STDs):     Last Reviewed on 4/11/2017 01:30 PM by Jessica Arora            Current Problems:     Last Reviewed on 1/22/2018 04:05 PM by Bryan Nuñez    IBS, diarrhea prominent type     Allergies         Immunizations:     None        Allergies:     Last Reviewed on 1/22/2018  04:04 PM by Bryan Nuñez      No Known Drug Allergies.         Current Medications:     Last Reviewed on 1/22/2018 04:05 PM by Bryan Nuñez    Hyoscyamine Sulfate 0.375mg Tablets, Extended Release Take 1 tablet(s) by mouth bid     allergy shots weekly     EpiPen Auto-Injector 2-Siddharth 0.3mg Solution For Injection use as directed  PRN     Fluticasone Propionate 50mcg/1actuation Nasal Spray 1 spray each nostil daily     Zyrtec 10mg Tablet 1 tab daily         OBJECTIVE:        Vitals:         Current: 1/22/2018 3:58:52 PM    Ht:  5 ft, 8.5 in;  Wt: 233.6 lbs;  BMI: 35.0    T: 99 F (oral);  BP: 119/72 mm Hg (left arm, sitting);  P: 103 bpm (left arm (BP Cuff), sitting);  sCr: 0.89 mg/dL;  GFR: 139.71        Exams:     PHYSICAL EXAM:     GENERAL: Vitals recorded well developed, well nourished;  well groomed;  no apparent distress;     SKIN: tinea cruris;     NEUROLOGIC: GROSSLY INTACT     PSYCHIATRIC:  appropriate affect and demeanor; normal speech pattern; grossly normal memory;         ASSESSMENT           110.3   B35.6  Tinea cruris              DDx:         ORDERS:         Meds Prescribed:       Lotrisone (Betamethasone/Clotrimazole) Cream Apply small amount to affected area bid  #45 (Forty Five) gm Refills: 2       Ketoconazole 200mg Tablet Take 1 tablet(s) by mouth daily  #30 (Thirty) tablet(s) Refills: 0                 PLAN:          Tinea cruris         FOLLOW-UP: Schedule follow-up appointments on a p.r.n. basis.      CONSIDER DERM EVAL           Prescriptions:       Lotrisone (Betamethasone/Clotrimazole) Cream Apply small amount to affected area bid  #45 (Forty Five) gm Refills: 2       Ketoconazole 200mg Tablet Take 1 tablet(s) by mouth daily  #30 (Thirty) tablet(s) Refills: 0             Patient Recommendations:        For  Tinea cruris:     Schedule follow-up appointments as needed.  I also recommend CONSIDER DERM EVAL.              CHARGE CAPTURE           **Please note: ICD descriptions  below are intended for billing purposes only and may not represent clinical diagnoses**        Primary Diagnosis:         110.3 Tinea cruris            B35.6    Tinea cruris              Orders:          46586   Office/outpatient visit; established patient, level 3  (In-House)

## 2021-07-01 VITALS
SYSTOLIC BLOOD PRESSURE: 119 MMHG | TEMPERATURE: 99 F | HEART RATE: 103 BPM | HEIGHT: 69 IN | DIASTOLIC BLOOD PRESSURE: 72 MMHG | BODY MASS INDEX: 34.6 KG/M2 | WEIGHT: 233.6 LBS

## 2021-07-01 VITALS
TEMPERATURE: 98.5 F | HEART RATE: 90 BPM | BODY MASS INDEX: 35.6 KG/M2 | WEIGHT: 240.4 LBS | SYSTOLIC BLOOD PRESSURE: 139 MMHG | HEIGHT: 69 IN | DIASTOLIC BLOOD PRESSURE: 72 MMHG

## 2021-07-02 VITALS
BODY MASS INDEX: 34.75 KG/M2 | SYSTOLIC BLOOD PRESSURE: 124 MMHG | TEMPERATURE: 97.5 F | WEIGHT: 234.6 LBS | HEART RATE: 102 BPM | DIASTOLIC BLOOD PRESSURE: 69 MMHG | HEIGHT: 69 IN

## 2021-07-02 VITALS
WEIGHT: 235.6 LBS | HEART RATE: 82 BPM | TEMPERATURE: 97.7 F | SYSTOLIC BLOOD PRESSURE: 136 MMHG | HEIGHT: 69 IN | BODY MASS INDEX: 34.9 KG/M2 | DIASTOLIC BLOOD PRESSURE: 80 MMHG

## 2021-07-02 VITALS — HEIGHT: 69 IN | BODY MASS INDEX: 36.02 KG/M2 | TEMPERATURE: 97.8 F

## 2022-02-21 ENCOUNTER — OFFICE VISIT (OUTPATIENT)
Dept: FAMILY MEDICINE CLINIC | Age: 27
End: 2022-02-21

## 2022-02-21 ENCOUNTER — HOSPITAL ENCOUNTER (OUTPATIENT)
Dept: GENERAL RADIOLOGY | Facility: HOSPITAL | Age: 27
Discharge: HOME | End: 2022-02-21
Admitting: NURSE PRACTITIONER

## 2022-02-21 VITALS
OXYGEN SATURATION: 99 % | HEIGHT: 70 IN | WEIGHT: 263 LBS | SYSTOLIC BLOOD PRESSURE: 137 MMHG | TEMPERATURE: 98.7 F | DIASTOLIC BLOOD PRESSURE: 73 MMHG | BODY MASS INDEX: 37.65 KG/M2 | HEART RATE: 66 BPM

## 2022-02-21 DIAGNOSIS — R05.3 PERSISTENT COUGH FOR 3 WEEKS OR LONGER: ICD-10-CM

## 2022-02-21 DIAGNOSIS — R05.3 PERSISTENT COUGH FOR 3 WEEKS OR LONGER: Primary | ICD-10-CM

## 2022-02-21 DIAGNOSIS — J30.9 ALLERGIC RHINITIS, UNSPECIFIED SEASONALITY, UNSPECIFIED TRIGGER: ICD-10-CM

## 2022-02-21 PROBLEM — F41.9 ANXIETY: Status: ACTIVE | Noted: 2022-02-21

## 2022-02-21 PROCEDURE — 71046 X-RAY EXAM CHEST 2 VIEWS: CPT

## 2022-02-21 PROCEDURE — 99214 OFFICE O/P EST MOD 30 MIN: CPT | Performed by: NURSE PRACTITIONER

## 2022-02-21 RX ORDER — OMEPRAZOLE 40 MG/1
CAPSULE, DELAYED RELEASE ORAL
COMMUNITY
End: 2022-02-21

## 2022-02-21 RX ORDER — HYOSCYAMINE SULFATE EXTENDED-RELEASE 0.38 MG/1
TABLET ORAL
COMMUNITY
End: 2022-02-21

## 2022-02-21 RX ORDER — DICYCLOMINE HCL 20 MG
20 TABLET ORAL AS NEEDED
COMMUNITY

## 2022-02-21 RX ORDER — EPINEPHRINE 0.3 MG/.3ML
0.3 INJECTION SUBCUTANEOUS
COMMUNITY

## 2022-02-21 RX ORDER — ALBUTEROL SULFATE 90 UG/1
2 AEROSOL, METERED RESPIRATORY (INHALATION) EVERY 4 HOURS PRN
Qty: 18 G | Refills: 1 | Status: SHIPPED | OUTPATIENT
Start: 2022-02-21

## 2022-02-21 RX ORDER — HYDROXYZINE HYDROCHLORIDE 25 MG/1
TABLET, FILM COATED ORAL
COMMUNITY

## 2022-02-21 RX ORDER — FLUTICASONE PROPIONATE 50 MCG
SPRAY, SUSPENSION (ML) NASAL
COMMUNITY

## 2022-02-21 RX ORDER — CETIRIZINE HYDROCHLORIDE 10 MG/1
TABLET ORAL
COMMUNITY

## 2022-02-21 NOTE — PROGRESS NOTES
Chief Complaint  David Aaron presents to [facility] for Establish Care (prior MA pt) and Shortness of Breath (onset 2MO)    Subjective          History of Present Illness    David has history of allergies. Has seen [facility]. Previously on allergy shots but has not received for about 2 months. Still taking Flonase and Zyrtec daily.   He also has hydroxyzine to take as needed for his anxiety. He has only taken a couple of times.   Was previously diagnosed with IBS by Dr Fady Munoz. Taking dicyclomine every day. Never had scope.     He is here today with c/o chest congestion. Cough and wheezing. Feels SOA at times and like he can't get a deep breath at times. Coughed up colored mucous last week. Dry cough typically. Worse at night but throughout the day. Reports that [facility] did testing about 2 years ago and was told that he did not have asthma. Previously told that the SOA was due to his anxiety. Has not had covid to his knowledge. He was prescribed steroid inhaler 2 years ago and it seemed to help symptoms. Never smoker himself. His mother was a smoker.     Review of Systems       No Known Allergies   Past Medical History:   Diagnosis Date   • Allergic    • Anxiety    • Irritable bowel syndrome      Current Outpatient Medications   Medication Sig Dispense Refill   • ALLERGY SERUM INJECTION Inject  under the skin into the appropriate area as directed 1 (One) Time Per Week.     • cetirizine (ZyrTEC Allergy) 10 MG tablet Zyrtec 10 mg oral tablet take 1 tablet (10 mg) by oral route once daily   Active     • dicyclomine (BENTYL) 20 MG tablet Take 20 mg by mouth Every 6 (Six) Hours.     • EPINEPHrine (EPIPEN) 0.3 MG/0.3ML solution auto-injector injection 0.3 mL.     • fluticasone (FLONASE) 50 MCG/ACT nasal spray fluticasone propionate 50 mcg/actuation nasal spray,suspension spray 1 spray (50 mcg) in each nostril by intranasal route once daily    "Active     • hydrOXYzine (ATARAX) 25 MG tablet hydroxyzine HCl 25 mg oral tablet take 1 tablet by oral route 3 times a day as needed   Active     • albuterol sulfate  (90 Base) MCG/ACT inhaler Inhale 2 puffs Every 4 (Four) Hours As Needed for Wheezing or Shortness of Air. 18 g 1     No current facility-administered medications for this visit.     Past Surgical History:   Procedure Laterality Date   • APPENDECTOMY        Social History     Tobacco Use   • Smoking status: Never Smoker   • Smokeless tobacco: Never Used   Vaping Use   • Vaping Use: Never used   Substance Use Topics   • Alcohol use: Never   • Drug use: Never     Family History   Problem Relation Age of Onset   • No Known Problems Mother    • Hypertension Father      Health Maintenance Due   Topic Date Due   • ANNUAL PHYSICAL  Never done   • HEPATITIS C SCREENING  Never done      There is no immunization history for the selected administration types on file for this patient.     Objective     Vitals:    02/21/22 1111   BP: 137/73   Pulse: 66   Temp: 98.7 °F (37.1 °C)   SpO2: 99%   Weight: 119 kg (263 lb)   Height: 177.8 cm (70\")     Body mass index is 37.74 kg/m².     Physical Exam  Vitals reviewed.   Constitutional:       General: He is not in acute distress.     Appearance: Normal appearance. He is well-developed.   HENT:      Head: Normocephalic and atraumatic.      Right Ear: Tympanic membrane and ear canal normal.      Left Ear: Tympanic membrane and ear canal normal.      Nose: Nose normal.      Mouth/Throat:      Mouth: Mucous membranes are moist.   Eyes:      Extraocular Movements: Extraocular movements intact.      Pupils: Pupils are equal, round, and reactive to light.   Cardiovascular:      Rate and Rhythm: Normal rate and regular rhythm.   Pulmonary:      Effort: Pulmonary effort is normal.      Breath sounds: Normal breath sounds.   Neurological:      Mental Status: He is alert and oriented to person, place, and time.   Psychiatric:    "      Mood and Affect: Mood and affect normal.           Result Review :                               Assessment and Plan      Diagnoses and all orders for this visit:    1. Persistent cough for 3 weeks or longer (Primary)  Comments:  Checking xray. Rescue inhaler. Consider repeating PFTs with allergist.   Orders:  -     XR Chest PA & Lateral; Future  -     albuterol sulfate  (90 Base) MCG/ACT inhaler; Inhale 2 puffs Every 4 (Four) Hours As Needed for Wheezing or Shortness of Air.  Dispense: 18 g; Refill: 1    2. Allergic rhinitis, unspecified seasonality, unspecified trigger  Comments:  Restart allergy shots              Follow Up     Return for Annual physical.

## 2022-08-18 ENCOUNTER — TELEPHONE (OUTPATIENT)
Dept: FAMILY MEDICINE CLINIC | Age: 27
End: 2022-08-18

## 2022-08-18 ENCOUNTER — OFFICE VISIT (OUTPATIENT)
Dept: FAMILY MEDICINE CLINIC | Age: 27
End: 2022-08-18

## 2022-08-18 VITALS
HEIGHT: 70 IN | BODY MASS INDEX: 38.08 KG/M2 | WEIGHT: 266 LBS | SYSTOLIC BLOOD PRESSURE: 117 MMHG | HEART RATE: 76 BPM | TEMPERATURE: 98.6 F | DIASTOLIC BLOOD PRESSURE: 67 MMHG

## 2022-08-18 DIAGNOSIS — F41.9 ANXIETY: ICD-10-CM

## 2022-08-18 DIAGNOSIS — J30.9 ALLERGIC RHINITIS, UNSPECIFIED SEASONALITY, UNSPECIFIED TRIGGER: ICD-10-CM

## 2022-08-18 DIAGNOSIS — R06.83 SNORING: ICD-10-CM

## 2022-08-18 DIAGNOSIS — Z00.00 ANNUAL PHYSICAL EXAM: Primary | ICD-10-CM

## 2022-08-18 PROCEDURE — 99395 PREV VISIT EST AGE 18-39: CPT | Performed by: NURSE PRACTITIONER

## 2022-08-18 NOTE — PROGRESS NOTES
Chief Complaint  David Aaron presents to [facility] for Annual Exam    Subjective          History of Present Illness    David is here today for annual preventive exam.  Declines covid vaccine, Tdap vaccine.  Never smoker.     He has allergies. Has seen [facility]. Has rescue inhaler to use as needed. Taking cetirizine and flonase. Has restarted taking allergy shots weekly.   He also has hydroxyzine to take as needed for his anxiety. He has only taken a couple of times.   Was previously diagnosed with IBS by Dr Fady Munoz. Taking dicyclomine every day. Never had scope.   He is requesting referral to sleep center for evaluation of sleep apnea. He does snore. He was seen by ENT in the past who recommended this to him.     Review of Systems   Constitutional: Negative for chills and fever.   HENT: Negative for ear pain and sore throat.    Eyes: Negative for blurred vision and redness.   Respiratory: Negative for cough, shortness of breath and wheezing.    Cardiovascular: Negative for chest pain and palpitations.   Gastrointestinal: Negative for abdominal pain, constipation, diarrhea, nausea and vomiting.   Genitourinary: Negative for dysuria, frequency and urgency.   Musculoskeletal: Negative for back pain and joint swelling.   Skin: Negative for rash.   Neurological: Negative for dizziness and syncope.   Psychiatric/Behavioral: Negative for suicidal ideas and depressed mood.         No Known Allergies   Past Medical History:   Diagnosis Date   • Allergic    • Anxiety    • Irritable bowel syndrome      Current Outpatient Medications   Medication Sig Dispense Refill   • albuterol sulfate  (90 Base) MCG/ACT inhaler Inhale 2 puffs Every 4 (Four) Hours As Needed for Wheezing or Shortness of Air. 18 g 1   • ALLERGY SERUM INJECTION Inject  under the skin into the appropriate area as directed 1 (One) Time Per Week.     • cetirizine (zyrTEC) 10 MG tablet Zyrtec 10  "mg oral tablet take 1 tablet (10 mg) by oral route once daily   Active     • dicyclomine (BENTYL) 20 MG tablet Take 20 mg by mouth As Needed.     • EPINEPHrine (EPIPEN) 0.3 MG/0.3ML solution auto-injector injection 0.3 mL.     • fluticasone (FLONASE) 50 MCG/ACT nasal spray fluticasone propionate 50 mcg/actuation nasal spray,suspension spray 1 spray (50 mcg) in each nostril by intranasal route once daily   Active     • hydrOXYzine (ATARAX) 25 MG tablet hydroxyzine HCl 25 mg oral tablet take 1 tablet by oral route 3 times a day as needed   Active       No current facility-administered medications for this visit.     Past Surgical History:   Procedure Laterality Date   • APPENDECTOMY        Social History     Tobacco Use   • Smoking status: Never Smoker   • Smokeless tobacco: Never Used   Vaping Use   • Vaping Use: Never used   Substance Use Topics   • Alcohol use: Never   • Drug use: Never     Family History   Problem Relation Age of Onset   • No Known Problems Mother    • Hypertension Father      Health Maintenance Due   Topic Date Due   • ANNUAL PHYSICAL  Never done      There is no immunization history for the selected administration types on file for this patient.     Objective     Vitals:    08/18/22 1033   BP: 117/67   BP Location: Left arm   Patient Position: Sitting   Pulse: 76   Temp: 98.6 °F (37 °C)   TempSrc: Oral   Weight: 121 kg (266 lb)   Height: 177.8 cm (70\")     Body mass index is 38.17 kg/m².     Physical Exam  Vitals reviewed.   Constitutional:       General: He is not in acute distress.     Appearance: Normal appearance.   HENT:      Head: Normocephalic and atraumatic.      Right Ear: Hearing, tympanic membrane and ear canal normal.      Left Ear: Hearing, tympanic membrane and ear canal normal.      Mouth/Throat:      Mouth: Mucous membranes are moist.   Eyes:      Extraocular Movements: Extraocular movements intact.      Pupils: Pupils are equal, round, and reactive to light.   Cardiovascular:    "   Rate and Rhythm: Normal rate and regular rhythm.   Pulmonary:      Effort: Pulmonary effort is normal. No respiratory distress.      Breath sounds: Normal breath sounds.   Abdominal:      General: Bowel sounds are normal.      Palpations: Abdomen is soft.      Tenderness: There is no abdominal tenderness.   Musculoskeletal:         General: Normal range of motion.      Cervical back: Normal range of motion and neck supple.   Skin:     General: Skin is warm and dry.   Neurological:      Mental Status: He is alert and oriented to person, place, and time.   Psychiatric:         Mood and Affect: Mood normal.           Result Review :     The following data was reviewed by: JEFF Aleman on 08/18/2022:          Physical [facility] Panel (05/19/2020 13:01)       XR Chest PA & Lateral (02/21/2022 11:53)             Assessment and Plan      Diagnoses and all orders for this visit:    1. Annual physical exam (Primary)  Comments:  Appropriate screenings and vaccinations were reviewed with the pt and offered as indicated.  Pt counseled on healthy lifestyle including healthy diet, exercise.    2. Snoring  -     Ambulatory Referral to Sleep Medicine    3. Allergic rhinitis, unspecified seasonality, unspecified trigger  Comments:  Continue follow up with allergist as per their recommendations    4. Anxiety  Comments:  Stable. Denies need for hydroxyzine refill.                 Follow Up     Return in about 1 year (around 8/18/2023) for Annual physical.

## 2023-04-13 PROBLEM — G47.33 OBSTRUCTIVE SLEEP APNEA SYNDROME: Status: ACTIVE | Noted: 2023-04-13

## 2023-08-25 ENCOUNTER — OFFICE VISIT (OUTPATIENT)
Dept: FAMILY MEDICINE CLINIC | Age: 28
End: 2023-08-25
Payer: COMMERCIAL

## 2023-08-25 VITALS
BODY MASS INDEX: 33.01 KG/M2 | SYSTOLIC BLOOD PRESSURE: 120 MMHG | TEMPERATURE: 98.1 F | DIASTOLIC BLOOD PRESSURE: 77 MMHG | HEART RATE: 82 BPM | HEIGHT: 70 IN | WEIGHT: 230.6 LBS

## 2023-08-25 DIAGNOSIS — Z00.00 ANNUAL PHYSICAL EXAM: Primary | ICD-10-CM

## 2023-08-25 DIAGNOSIS — F41.9 ANXIETY: ICD-10-CM

## 2023-08-25 DIAGNOSIS — G47.33 OBSTRUCTIVE SLEEP APNEA SYNDROME: ICD-10-CM

## 2023-08-25 DIAGNOSIS — Z23 IMMUNIZATION DUE: ICD-10-CM

## 2023-08-25 DIAGNOSIS — K58.0 IRRITABLE BOWEL SYNDROME WITH DIARRHEA: ICD-10-CM

## 2023-08-25 DIAGNOSIS — J30.9 ALLERGIC RHINITIS, UNSPECIFIED SEASONALITY, UNSPECIFIED TRIGGER: ICD-10-CM

## 2023-08-25 PROCEDURE — 99395 PREV VISIT EST AGE 18-39: CPT

## 2023-08-25 NOTE — PROGRESS NOTES
Chief Complaint  Annual Exam    Subjective          David Aaron presents to [facility] today for his annual physical.      Patient is a 28-year-old male, presents today for his annual physical.  His normal PCP is JEFF Jimenez.  Patient currently works at [employer].  Reports his diet is pretty good, he has been working on calorie counting and has lost 36 pounds since his appointment last year.  He also reports he has been walking more frequently.  He is a non-smoker and denies any alcohol use.  He is due for an eye exam.  He is up-to-date on his dental exam.  He is due for his COVID vaccines as well as his Tdap.    He has a history of allergic rhinitis for which he sees [facility].  He is prescribed allergy shots, Zyrtec, Flonase, albuterol as needed.  He also has an EpiPen.  Reports these medications are effective.    Patient has a history of IBS with diarrhea, treated with Bentyl.  He is seeing Dr. Fady Munoz for this.  Tolerating medications well.    Patient has a history of anxiety and is on hydroxyzine as needed.  Denies any history of depression and denies any thoughts of SI/HI.  He states he very infrequently has to use his hydroxyzine and denies refill needs today.    He does have a history of sleep apnea.  He reports that he could not tolerate the CPAP.    Review of Systems   Constitutional:  Negative for appetite change and unexpected weight change.   Respiratory:  Negative for shortness of breath and wheezing.    Cardiovascular:  Negative for chest pain, palpitations and leg swelling.   Gastrointestinal:  Positive for diarrhea (IBS). Negative for blood in stool.   Genitourinary:  Negative for hematuria.   Musculoskeletal:  Negative for arthralgias and joint swelling.   Psychiatric/Behavioral:  Negative for suicidal ideas. The patient is nervous/anxious.        Current Outpatient Medications:     albuterol sulfate  (90 Base) MCG/ACT  "inhaler, Inhale 2 puffs Every 4 (Four) Hours As Needed for Wheezing or Shortness of Air., Disp: 18 g, Rfl: 1    ALLERGY SERUM INJECTION, Inject  under the skin into the appropriate area as directed 1 (One) Time Per Week., Disp: , Rfl:     cetirizine (zyrTEC) 10 MG tablet, Zyrtec 10 mg oral tablet take 1 tablet (10 mg) by oral route once daily   Active, Disp: , Rfl:     dicyclomine (BENTYL) 20 MG tablet, Take 1 tablet by mouth As Needed., Disp: , Rfl:     EPINEPHrine (EPIPEN) 0.3 MG/0.3ML solution auto-injector injection, 0.3 mL., Disp: , Rfl:     fluticasone (FLONASE) 50 MCG/ACT nasal spray, fluticasone propionate 50 mcg/actuation nasal spray,suspension spray 1 spray (50 mcg) in each nostril by intranasal route once daily   Active, Disp: , Rfl:     hydrOXYzine (ATARAX) 25 MG tablet, hydroxyzine HCl 25 mg oral tablet take 1 tablet by oral route 3 times a day as needed   Active, Disp: , Rfl:     Allergies:  Patient has no known allergies.      Objective   Vital Signs:   Vitals:    08/25/23 1114   BP: 120/77   BP Location: Left arm   Patient Position: Sitting   Pulse: 82   Temp: 98.1 øF (36.7 øC)   TempSrc: Oral   Weight: 105 kg (230 lb 9.6 oz)   Height: 177.8 cm (70\")     Physical Exam  Vitals and nursing note reviewed.   Constitutional:       General: He is not in acute distress.     Appearance: Normal appearance. He is not ill-appearing.   HENT:      Head: Normocephalic.      Right Ear: Tympanic membrane, ear canal and external ear normal.      Left Ear: Tympanic membrane, ear canal and external ear normal.      Nose: Nose normal.      Mouth/Throat:      Lips: Pink.      Mouth: Mucous membranes are moist.   Eyes:      Extraocular Movements: Extraocular movements intact.      Conjunctiva/sclera: Conjunctivae normal.      Pupils: Pupils are equal, round, and reactive to light.   Cardiovascular:      Rate and Rhythm: Normal rate and regular rhythm.      Heart sounds: Normal heart sounds. No murmur heard.  Pulmonary:    "   Effort: Pulmonary effort is normal. No accessory muscle usage or respiratory distress.      Breath sounds: Normal breath sounds. No wheezing or rhonchi.   Abdominal:      General: Bowel sounds are normal. There is no distension.      Palpations: Abdomen is soft.      Tenderness: There is no abdominal tenderness. There is no guarding or rebound.   Musculoskeletal:      Cervical back: Normal range of motion. No rigidity. Normal range of motion.   Lymphadenopathy:      Cervical: No cervical adenopathy.   Skin:     General: Skin is warm and dry.      Capillary Refill: Capillary refill takes less than 2 seconds.   Neurological:      General: No focal deficit present.      Mental Status: He is alert and oriented to person, place, and time.      Coordination: Finger-Nose-Finger Test normal. Rapid alternating movements normal.      Gait: Gait is intact.   Psychiatric:         Mood and Affect: Mood and affect normal.         Behavior: Behavior normal.           Assessment and Plan    Diagnoses and all orders for this visit:    1. Annual physical exam (Primary)    2. Immunization due  Comments:  Declines COVID-vaccine and Tdap.    3. Allergic rhinitis, unspecified seasonality, unspecified trigger  Comments:  Continue medications as prescribed, continue to follow-up with [facility].    4. Irritable bowel syndrome with diarrhea  Comments:  Continue medications as prescribed, continue follow-up with Dr. Munoz.    5. Anxiety  Comments:  Well-controlled, denies SI/HI, no refills of hydroxyzine needed today per patient.    6. Obstructive sleep apnea syndrome  Comments:  Does not tolerate CPAP, follow-up with sleep medicine.      Annual physical exam completed today.  Recommended health maintenance topics were reviewed and discussed with patient.  Patient declines labs today.  Recommend he continue with healthy diet and routine exercise.  Continue with routine eye and dental exams.  Follow-up in 1 year for annual  physical with PCP or sooner as needed.      Follow Up   Return in about 1 year (around 8/25/2024) for Annual physical.  Patient was given instructions and counseling regarding his condition or for health maintenance advice. Please see specific information pulled into the AVS if appropriate.         08/25/2023